# Patient Record
Sex: MALE | Race: BLACK OR AFRICAN AMERICAN | NOT HISPANIC OR LATINO | URBAN - METROPOLITAN AREA
[De-identification: names, ages, dates, MRNs, and addresses within clinical notes are randomized per-mention and may not be internally consistent; named-entity substitution may affect disease eponyms.]

---

## 2020-09-11 ENCOUNTER — EMERGENCY (EMERGENCY)
Facility: HOSPITAL | Age: 30
LOS: 1 days | Discharge: HOME | End: 2020-09-11
Attending: EMERGENCY MEDICINE | Admitting: EMERGENCY MEDICINE
Payer: SELF-PAY

## 2020-09-11 VITALS
RESPIRATION RATE: 16 BRPM | TEMPERATURE: 98 F | WEIGHT: 160.06 LBS | HEART RATE: 74 BPM | OXYGEN SATURATION: 98 % | SYSTOLIC BLOOD PRESSURE: 127 MMHG | DIASTOLIC BLOOD PRESSURE: 51 MMHG

## 2020-09-11 VITALS
RESPIRATION RATE: 17 BRPM | DIASTOLIC BLOOD PRESSURE: 65 MMHG | TEMPERATURE: 98 F | SYSTOLIC BLOOD PRESSURE: 125 MMHG | OXYGEN SATURATION: 99 % | HEART RATE: 70 BPM

## 2020-09-11 PROBLEM — Z00.00 ENCOUNTER FOR PREVENTIVE HEALTH EXAMINATION: Status: ACTIVE | Noted: 2020-09-11

## 2020-09-11 PROCEDURE — 96372 THER/PROPH/DIAG INJ SC/IM: CPT

## 2020-09-11 PROCEDURE — 99053 MED SERV 10PM-8AM 24 HR FAC: CPT

## 2020-09-11 PROCEDURE — 99284 EMERGENCY DEPT VISIT MOD MDM: CPT

## 2020-09-11 PROCEDURE — 99283 EMERGENCY DEPT VISIT LOW MDM: CPT | Mod: 25

## 2020-09-11 RX ORDER — DIAZEPAM 5 MG
5 TABLET ORAL ONCE
Refills: 0 | Status: DISCONTINUED | OUTPATIENT
Start: 2020-09-11 | End: 2020-09-11

## 2020-09-11 RX ORDER — KETOROLAC TROMETHAMINE 30 MG/ML
30 SYRINGE (ML) INJECTION ONCE
Refills: 0 | Status: DISCONTINUED | OUTPATIENT
Start: 2020-09-11 | End: 2020-09-11

## 2020-09-11 RX ADMIN — Medication 30 MILLIGRAM(S): at 01:20

## 2020-09-11 RX ADMIN — Medication 30 MILLIGRAM(S): at 01:35

## 2020-09-11 RX ADMIN — Medication 5 MILLIGRAM(S): at 01:21

## 2020-09-11 NOTE — ED ADULT TRIAGE NOTE - CHIEF COMPLAINT QUOTE
lower back pain/ lt flank area for 2 days noted after lifting weight at the gym. Pt denies urinary problem, no fever.

## 2020-09-11 NOTE — ED PROVIDER NOTE - PHYSICAL EXAMINATION
CONSTITUTIONAL: Well-appearing; well-nourished; in no apparent distress.   HEAD: Normocephalic; atraumatic.   EYES:  conjunctiva and sclera clear  ENT: normal nose; no rhinorrhea;  NECK: Supple; full ROM  RESPIRATORY: Breathing easily; no resp difficulty  BACK: no spinal tenderness. +spasm felt to L lumbar paraspinal muscles, not tender to palpation. No CVA tenderness or flank hematoma.   EXT: No cyanosis or edema;  SKIN: Normal for age and race; warm; dry; good turgor; no apparent lesions or rash.   NEURO: A & O x 3; face symmetric; grossly unremarkable. Motor/sensory intact in all extremities, ambulatory with steady gait.  PSYCHOLOGICAL: The patient’s mood and manner are appropriate.

## 2020-09-11 NOTE — ED PROVIDER NOTE - PATIENT PORTAL LINK FT
You can access the FollowMyHealth Patient Portal offered by Health system by registering at the following website: [email or web address]. By joining [employer]’s FollowMyHealth portal, you will also be able to view your health information using other applications (apps) compatible with our system.

## 2020-09-11 NOTE — ED PROVIDER NOTE - NSFOLLOWUPCLINICS_GEN_ALL_ED_FT
[facility] - Podiatry Clinic  Podiatry  [address] [place] [address]  Phone: [phone]  Fax:   Follow Up Time:

## 2020-09-11 NOTE — ED PROVIDER NOTE - OBJECTIVE STATEMENT
30M here w/ 2 days of L lower back pain that started while he was working out, he bent over to  a 30lb weight. reports pain immediately after lifting, that has not improved. pt reports some difficulty ambulating, denies numbness/ tingling. pt reports that this has happened in the past and he has taken muscle relaxers for it, has methocarbamol at home but was not sure what kind of medication it was, so did not take it, came in instead for eval. endorses poor posture when bending over, not squatting to  heavy weights. Does not stretch or do any mobility/flexibility exercises despite using heavy weights. denies cp, sob, n ,v ,d, fevers, cough, weakness, HA, neck tenderness, gu symptoms. Similar to prior episode.   · Presenting Symptoms: BACK PAIN, STIFFNESS  · Negative Findings: no anorexia, no bladder dysfunction, no bowel dysfunction, no constipation, no fatigue, no motor function loss, no neck tenderness, no numbness, no tingling  · Location: back   · Laterality: left   · Area: lower   · Radiation: no radiation   · Timing: sudden onset  · Duration: day(s)  2   · Context: lifting  · Incident Location: gym  · Aggravated Factors: walking  · Relieving Factors: none

## 2020-09-11 NOTE — ED ADULT NURSE NOTE - CHPI ED NUR SYMPTOMS NEG
no bladder dysfunction/no bowel dysfunction/no constipation/no tingling/no anorexia/no motor function loss/no neck tenderness/no numbness/no fatigue

## 2020-09-11 NOTE — ED PROVIDER NOTE - NSFOLLOWUPINSTRUCTIONS_ED_ALL_ED_FT
Lumbosacral Strain    Lumbosacral strain is an injury that causes pain in the lower back (lumbosacral spine). This injury usually happens from overstretching the muscles or ligaments along your spine. Ligaments are cord-like tissues that connect bones to other bones. A strain can affect one or more muscles or ligaments.  What are the causes?  This condition may be caused by:  A hard, direct hit to the back.Overstretching the lower back muscles. This may result from:  A fall.Lifting something heavy.Repetitive movements such as bending or crouching.What increases the risk?  The following factors may make you more likely to develop this condition:  Participating in sports or activities that involve:  A sudden twist of the back.Pushing or pulling motions.Being overweight or obese.Having poor strength and flexibility, especially tight hamstrings or weak muscles in the back or abdomen.Having too much of a curve in the lower back.Having a pelvis that is tilted forward.What are the signs or symptoms?  The main symptom of this condition is pain in the lower back, at the site of the strain. Pain may also be felt down one or both legs.  How is this diagnosed?  This condition is diagnosed based on your symptoms, your medical history, and a physical exam. During the physical exam, your health care provider may push on certain areas of your back to find the source of your pain.  You may be asked to bend forward, backward, and side to side to check your pain and range of motion. You may also have imaging tests, such as X-rays and an MRI.  How is this treated?  This condition may be treated by:  Applying heat and cold on the affected area.Taking medicines to help relieve pain and relax your muscles.Taking NSAIDs, such as ibuprofen, to help reduce swelling and discomfort.Doing stretching and strengthening exercises for your lower back.Symptoms usually improve within several weeks of treatment. However, recovery time varies. When your symptoms improve, gradually return to your normal routine as soon as possible to reduce pain, avoid stiffness, and keep muscle strength.  Follow these instructions at home:  Medicines     Take over-the-counter and prescription medicines only as told by your health care provider.Ask your health care provider if the medicine prescribed to you:  Requires you to avoid driving or using heavy machinery.Can cause constipation. You may need to take these actions to prevent or treat constipation:  Drink enough fluid to keep your urine pale yellow.Take over-the-counter or prescription medicines.Eat foods that are high in fiber, such as beans, whole grains, and fresh fruits and vegetables.Limit foods that are high in fat and processed sugars, such as fried or sweet foods.Managing pain, stiffness, and swelling         If directed, put ice on the injured area. To do this:  Put ice in a plastic bag.Place a towel between your skin and the bag.Leave the ice on for 20 minutes, 2–3 times a day.If directed, apply heat on the affected area as often as told by your health care provider. Use the heat source that your health care provider recommends, such as a moist heat pack or a heating pad.  Place a towel between your skin and the heat source.Leave the heat on for 20–30 minutes.Remove the heat if your skin turns bright red. This is especially important if you are unable to feel pain, heat, or cold. You may have a greater risk of getting burned.Activity     Rest as told by your health care provider.Do not stay in bed. Staying in bed for more than 1–2 days can delay your recovery.Return to your normal activities as told by your health care provider. Ask your health care provider what activities are safe for you.Avoid activities that take a lot of energy for as long as told by your health care provider.Do exercises as told by your health care provider. This includes stretching and strengthening exercises.General instructions     Sit up and stand up straight. Avoid leaning forward when you sit, or hunching over when you stand.Do not use any products that contain nicotine or tobacco, such as cigarettes, e-cigarettes, and chewing tobacco. If you need help quitting, ask your health care provider.Keep all follow-up visits as told by your health care provider. This is important.How is this prevented?     Use correct form when playing sports and lifting heavy objects.Use good posture when sitting and standing.Maintain a healthy weight.Sleep on a mattress with medium firmness to support your back.Do at least 150 minutes of moderate-intensity exercise each week, such as brisk walking or water aerobics. Try a form of exercise that takes stress off your back, such as swimming or stationary cycling.Maintain physical fitness, including:  Strength.Flexibility.Contact a health care provider if:  Your back pain does not improve after several weeks of treatment.Your symptoms get worse.Get help right away if:  Your back pain is severe.You cannot stand or walk.You have difficulty controlling when you urinate or when you have a bowel movement.You feel nauseous or you vomit.Your feet or legs get very cold, turn pale, or look blue.You have numbness, tingling, weakness, or problems using your arms or legs.You develop any of the following:  Shortness of breath.Dizziness.Pain in your legs.Weakness in your buttocks or legs.Summary  Lumbosacral strain is an injury that causes pain in the lower back (lumbosacral spine).This injury usually happens from overstretching the muscles or ligaments along your spine.This condition may be caused by a direct hit to the lower back or by overstretching the lower back muscles.Symptoms usually improve within several weeks of treatment.

## 2020-09-11 NOTE — ED ADULT NURSE NOTE - OBJECTIVE STATEMENT
pt a&ox4 resting comfortably in lounge chair. pt reports that 2 days ago while he was working out, he bent over to  a 30lb weight. reports pain immediately after lifting, that has not improved. pt reports some difficulty ambulating, denies numbness/ tingling. pt reports that this has happened in the past and he has taken muscle relaxers for it. denies cp, sob, n ,v ,d, fevers, cough, weakness, HA, neck tenderness, gu symptoms.

## 2020-09-11 NOTE — ED PROVIDER NOTE - CLINICAL SUMMARY MEDICAL DECISION MAKING FREE TEXT BOX
here w/ likely MSK strain. discussed proper posture, strengthening/stretching associated muscles. plan for IM toradol and muscle relaxant, pt already w/ tylenol and methocarbamol at home, to continue with this. No concern at this time for cord compression or cauda equina. DC home in NAD with strict return precautions given.    Also asking for # for podiatry clinic as having some issues when running and developing callouses, no emergent complaints today. clinic # given

## 2021-10-23 ENCOUNTER — EMERGENCY (EMERGENCY)
Facility: HOSPITAL | Age: 31
LOS: 1 days | Discharge: HOME | End: 2021-10-23
Admitting: EMERGENCY MEDICINE
Payer: COMMERCIAL

## 2021-10-23 VITALS
DIASTOLIC BLOOD PRESSURE: 68 MMHG | SYSTOLIC BLOOD PRESSURE: 109 MMHG | HEIGHT: 71 IN | HEART RATE: 60 BPM | WEIGHT: 169.98 LBS | TEMPERATURE: 98 F | RESPIRATION RATE: 17 BRPM | OXYGEN SATURATION: 98 %
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
## 2021-10-23 DIAGNOSIS — M54.50 LOW BACK PAIN, UNSPECIFIED: ICD-10-CM
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
## 2021-10-23 PROCEDURE — 96372 THER/PROPH/DIAG INJ SC/IM: CPT

## 2021-10-23 PROCEDURE — 99284 EMERGENCY DEPT VISIT MOD MDM: CPT

## 2021-10-23 PROCEDURE — 99283 EMERGENCY DEPT VISIT LOW MDM: CPT | Mod: 25

## 2021-10-23 RX ORDER — IBUPROFEN 200 MG
1 TABLET ORAL
Qty: 15 | Refills: 0
Start: 2021-10-23 | End: 2021-10-27

## 2021-10-23 RX ORDER — DIAZEPAM 5 MG
5 TABLET ORAL ONCE
Refills: 0 | Status: DISCONTINUED | OUTPATIENT
Start: 2021-10-23 | End: 2021-10-23

## 2021-10-23 RX ORDER — KETOROLAC TROMETHAMINE 30 MG/ML
15 SYRINGE (ML) INJECTION ONCE
Refills: 0 | Status: DISCONTINUED | OUTPATIENT
Start: 2021-10-23 | End: 2021-10-23

## 2021-10-23 RX ORDER — LIDOCAINE 4 G/100G
1 CREAM TOPICAL
Qty: 10 | Refills: 0
Start: 2021-10-23 | End: 2021-10-27

## 2021-10-23 RX ORDER — CYCLOBENZAPRINE HYDROCHLORIDE 10 MG/1
1 TABLET, FILM COATED ORAL
Qty: 6 | Refills: 0
Start: 2021-10-23 | End: 2021-10-25

## 2021-10-23 RX ORDER — LIDOCAINE 4 G/100G
1 CREAM TOPICAL ONCE
Refills: 0 | Status: COMPLETED | OUTPATIENT
Start: 2021-10-23 | End: 2021-10-23

## 2021-10-23 RX ADMIN — Medication 15 MILLIGRAM(S): at 18:27

## 2021-10-23 RX ADMIN — Medication 5 MILLIGRAM(S): at 18:27

## 2021-10-23 RX ADMIN — LIDOCAINE 1 PATCH: 4 CREAM TOPICAL at 18:27

## 2021-10-23 NOTE — ED ADULT TRIAGE NOTE - CHIEF COMPLAINT QUOTE
pt c/o lower back pain after doing squats at gym 6 days ago. pain radiates to bilateral legs. no numbness/tingling or loss of bowel/bladder fx

## 2021-10-23 NOTE — ED PROVIDER NOTE - PRINCIPAL DIAGNOSIS
I was awakened by a very sharp pain to my left leg.  I have never had a pain like it before.
Back pain

## 2021-10-23 NOTE — ED PROVIDER NOTE - OBJECTIVE STATEMENT
30 y/o M with no reported PMHx presents to the ED c/o 6 days of lower back pain after doing squats at the gym. Pt states he was lifting weights when he felt a pull in his lower back, and states he's had lower back pain, worse w/ movement and position change, since the incident. Has not taken anything at home for the pain. Denies radiation of pain, numbness or weakness of b/l lower extremities, saddle anesthesia, bowel/bladder incontinence, or h/o prior back injuries or surgeries.

## 2021-10-23 NOTE — ED ADULT NURSE NOTE - OBJECTIVE STATEMENT
31y male presents to ED c/o lower back pain. Pt states he was at gym at Monday, doing squats and felt pain in lower back. Pain has not gone away so pt presents to ED. Ambulatory. RUCKER. No signs of trauma/deformity. Denies numbness/tingling/PMH. A&Ox4.

## 2021-10-23 NOTE — ED PROVIDER NOTE - NS ED ROS FT
Constitutional: No fever. No chills.  Eyes: No redness. No discharge. No vision change.   ENT: No sore throat. No ear pain.  Cardiovascular: No chest pain. No leg swelling.  Respiratory: No cough. No shortness of breath.  GI: No abdominal pain. No vomiting. No diarrhea.   MSK: + lower back pain  No joint pain.    Skin: No rash. No abrasions.   Neuro: No numbness. No weakness.   Psych: No known mental health issues.

## 2021-10-23 NOTE — ED PROVIDER NOTE - CLINICAL SUMMARY MEDICAL DECISION MAKING FREE TEXT BOX
Pt hemodynamically stable. No midline spinal tenderness, red flag back pain sx, or focal neurologic deficits. Ambulating without difficulty. Pain improved after toradol 15mg IM, valium 5mg po, lidocaine patch. Discussed conservative management of back pain. Rx given for ibuprofen, flexeril and lidocaine patches. Return precautions given.

## 2021-10-23 NOTE — ED PROVIDER NOTE - NSFOLLOWUPINSTRUCTIONS_ED_ALL_ED_FT
Take ibuprofen 600mg up to three times daily for pain and inflammation.  Take one tab of flexeril 10mg twice daily for muscle spasm.  Apply lidocaine patch twice daily to affected area for additional relief. You can use heating pad or heat packs on top of the patch.     These medications were sent to your pharmacy but lidocaine patches and ibuprofen are also available over the counter.    Rest for several days and perform gentle stretching exercises.     If your symptoms fail to improve, please follow up with your primary care doctor.    Return to the Emergency Department if you develop fever>100.4F, worsening back pain, numbness or weakness in legs, loss of control of bowels or bladder, numbness around rectum or any other concerns.

## 2021-10-23 NOTE — ED PROVIDER NOTE - CHPI ED SYMPTOMS NEG
no weakness, no saddle anesthesia, no h/o prior back injuries/surgeries/no bladder dysfunction/no bowel dysfunction/no numbness

## 2021-10-23 NOTE — ED PROVIDER NOTE - PATIENT PORTAL LINK FT
You can access the FollowMyHealth Patient Portal offered by [facility] by registering at the following website: [email or web address]. By joining [facility]’s FollowMyHealth portal, you will also be able to view your health information using other applications (apps) compatible with our system.

## 2021-10-23 NOTE — ED PROVIDER NOTE - PHYSICAL EXAMINATION
VITAL SIGNS: I have reviewed nursing notes and confirm.  CONSTITUTIONAL: Well-developed; well-nourished; in no acute distress.   SKIN:  warm and dry, no acute rash.   HEAD:  normocephalic, atraumatic.  EYES: PERRL, EOM intact; conjunctiva and sclera clear.  ENT: No nasal discharge; airway clear.   NECK: Supple; non tender.  CARD: S1, S2 normal; no murmurs, gallops, or rubs. Regular rate and rhythm.   RESP:  Clear to auscultation b/l, no wheezes, rales or rhonchi.  ABD: Normal bowel sounds; soft; non-distended; non-tender; no guarding/ rebound.  EXT: no midline spinal ttp, mild ttp of the L lumbar paraspinal muscles, 5/5 strength w/ hip, knee, and ankle flexion and extension, sensation equal and intact, ambulating w/o difficulty.  Normal ROM. No clubbing, cyanosis or edema. 2+ pulses to b/l ue/le.  NEURO: Alert, oriented, grossly unremarkable  PSYCH: Cooperative, mood and affect appropriate. VITAL SIGNS: I have reviewed nursing notes and confirm.  CONSTITUTIONAL: Well-developed; well-nourished; in no acute distress.   SKIN:  warm and dry, no acute rash.   HEAD:  normocephalic, atraumatic.  EYES: PERRL, EOM intact; conjunctiva and sclera clear.  ENT: No nasal discharge; airway clear.   NECK: Supple; non tender.  CARD: S1, S2 normal; no murmurs, gallops, or rubs. Regular rate and rhythm.   RESP:  Clear to auscultation b/l, no wheezes, rales or rhonchi.  ABD: Normal bowel sounds; soft; non-distended; non-tender; no guarding/ rebound.  EXT: no midline spinal ttp, mild ttp of the L lumbar paraspinal muscles, 5/5 strength w/ hip, knee, and ankle flexion and extension, sensation equal and intact, ambulating w/o difficulty.  NEURO: Alert, oriented, grossly unremarkable  PSYCH: Cooperative, mood and affect appropriate.

## 2021-10-24 PROBLEM — Z78.9 OTHER SPECIFIED HEALTH STATUS: Chronic | Status: ACTIVE | Noted: 2020-09-11

## 2021-10-30 ENCOUNTER — EMERGENCY (EMERGENCY)
Facility: HOSPITAL | Age: 31
LOS: 1 days | Discharge: HOME | End: 2021-10-30
Admitting: EMERGENCY MEDICINE
Payer: COMMERCIAL

## 2021-10-30 VITALS
OXYGEN SATURATION: 97 % | HEIGHT: 71 IN | RESPIRATION RATE: 16 BRPM | TEMPERATURE: 98 F | HEART RATE: 65 BPM | WEIGHT: 169.98 LBS | DIASTOLIC BLOOD PRESSURE: 71 MMHG | SYSTOLIC BLOOD PRESSURE: 111 MMHG

## 2021-10-30 VITALS
TEMPERATURE: 98 F | OXYGEN SATURATION: 98 % | SYSTOLIC BLOOD PRESSURE: 114 MMHG | RESPIRATION RATE: 16 BRPM | HEART RATE: 71 BPM | DIASTOLIC BLOOD PRESSURE: 69 MMHG
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
## 2021-10-30 DIAGNOSIS — M54.50 LOW BACK PAIN, UNSPECIFIED: ICD-10-CM
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
## 2021-10-30 PROCEDURE — 99283 EMERGENCY DEPT VISIT LOW MDM: CPT | Mod: 25

## 2021-10-30 PROCEDURE — 96372 THER/PROPH/DIAG INJ SC/IM: CPT | Mod: XU

## 2021-10-30 PROCEDURE — 99284 EMERGENCY DEPT VISIT MOD MDM: CPT

## 2021-10-30 RX ORDER — KETOROLAC TROMETHAMINE 30 MG/ML
15 SYRINGE (ML) INJECTION ONCE
Refills: 0 | Status: DISCONTINUED | OUTPATIENT
Start: 2021-10-30 | End: 2021-10-30

## 2021-10-30 RX ORDER — METHOCARBAMOL 500 MG/1
750 TABLET, FILM COATED ORAL ONCE
Refills: 0 | Status: COMPLETED | OUTPATIENT
Start: 2021-10-30 | End: 2021-10-30

## 2021-10-30 RX ORDER — TRAMADOL HYDROCHLORIDE 50 MG/1
50 TABLET ORAL ONCE
Refills: 0 | Status: DISCONTINUED | OUTPATIENT
Start: 2021-10-30 | End: 2021-10-30

## 2021-10-30 RX ORDER — METHOCARBAMOL 500 MG/1
2 TABLET, FILM COATED ORAL
Qty: 42 | Refills: 0
Start: 2021-10-30 | End: 2021-11-05

## 2021-10-30 RX ORDER — TRAMADOL HYDROCHLORIDE 50 MG/1
1 TABLET ORAL
Qty: 14 | Refills: 0
Start: 2021-10-30 | End: 2021-11-05

## 2021-10-30 RX ADMIN — METHOCARBAMOL 750 MILLIGRAM(S): 500 TABLET, FILM COATED ORAL at 02:46

## 2021-10-30 RX ADMIN — Medication 15 MILLIGRAM(S): at 02:46

## 2021-10-30 RX ADMIN — TRAMADOL HYDROCHLORIDE 50 MILLIGRAM(S): 50 TABLET ORAL at 02:46

## 2021-10-30 NOTE — ED PROVIDER NOTE - OBJECTIVE STATEMENT
32 y/o M  presents to the ED c/o 2 weeks of lower back pain after doing squats at the gym. Pt states he was lifting weights when he felt a pull in his lower back, Pt states pain is worse w/ movement and position change, since the incident.  he was taken medications as recommended in the ER , but pain still persist. Denies radiation of pain, numbness or weakness of b/l lower extremities, saddle anesthesia, bowel/bladder incontinence, or h/o prior back injuries or surgeries.

## 2021-10-30 NOTE — ED PROVIDER NOTE - PATIENT PORTAL LINK FT
You can access the FollowMyHealth Patient Portal offered by [facility] by registering at the following website: [email or web address]. By joining [employer]’s FollowMyHealth portal, you will also be able to view your health information using other applications (apps) compatible with our system.

## 2021-10-30 NOTE — ED ADULT NURSE NOTE - HOW OFTEN DO YOU HAVE A DRINK CONTAINING ALCOHOL?
Patient expressing desire to die and stating \"just let me die\"  Patient stated he has a heavy drinking problem and has been hospitalized within the last year on a psych unit     SAD score 6  Dr. Antoinette Oshea notified   Suicide precautions initiated, sitter assigned to sit at bedside Monthly or less

## 2021-10-30 NOTE — ED PROVIDER NOTE - NSFOLLOWUPINSTRUCTIONS_ED_ALL_ED_FT
LOW BACK STRAIN - Discharge Care           Low Back Strain    WHAT YOU NEED TO KNOW:    Low back strain is an injury to your lower back muscles or tendons. Tendons are strong tissues that connect muscles to bones. The lower back supports most of your body weight and helps you move, twist, and bend.    DISCHARGE INSTRUCTIONS:    Seek care immediately if:   •You hear or feel a pop in your lower back.      •You have increased swelling or pain in your lower back.      •You have trouble moving your legs.      •Your legs are numb.      Contact your healthcare provider if:   •You have a fever.      •Your pain does not go away, even after treatment.      •You have questions or concerns about your condition or care.      Medicines: The following medicines may be ordered by your healthcare provider:   •Acetaminophen decreases pain and fever. It is available without a doctor's order. Ask how much to take and how often to take it. Follow directions. Acetaminophen can cause liver damage if not taken correctly.      •NSAIDs, such as ibuprofen, help decrease swelling, pain, and fever. This medicine is available with or without a doctor's order. NSAIDs can cause stomach bleeding or kidney problems in certain people. If you take blood thinner medicine, always ask your healthcare provider if NSAIDs are safe for you. Always read the medicine label and follow directions.      •Muscle relaxers help decrease pain and muscle spasms.      •Prescription pain medicine may be given. Ask how to take this medicine safely.      •Take your medicine as directed. Contact your healthcare provider if you think your medicine is not helping or if you have side effects. Tell him or her if you are allergic to any medicine. Keep a list of the medicines, vitamins, and herbs you take. Include the amounts, and when and why you take them. Bring the list or the pill bottles to follow-up visits. Carry your medicine list with you in case of an emergency.      Self-care:   •Rest as directed. You may need to rest in bed for a period of time after your injury. Do not lift heavy objects.       •Apply ice on your back for 15 to 20 minutes every hour or as directed. Use an ice pack, or put crushed ice in a plastic bag. Cover it with a towel. Ice helps prevent tissue damage and decreases swelling and pain.      •Apply heat on your lower back for 20 to 30 minutes every 2 hours for as many days as directed. Heat helps decrease pain and muscle spasms.      •Slowly start to increase your activity as the pain decreases, or as directed.      Prevent another low back strain:   •Use correct body movements. ?Bend at the hips and knees when you  objects. Do not bend from the waist. Use your leg muscles as you lift the load. Do not use your back. Keep the object close to your chest as you lift it. Try not to twist or lift anything above your waist.      ?Change your position often when you stand for long periods of time. Rest one foot on a small box or footrest, and then switch to the other foot often.      ?Try not to sit for long periods of time. When you do, sit in a straight-backed chair with your feet flat on the floor.      ?Never reach, pull, or push while you are sitting.      •Warm up before you exercise. Do exercises that strengthen your back muscles. Ask your healthcare provider about the best exercise plan for you.      •Maintain a healthy weight. Ask your healthcare provider how much you should weigh. Ask him to help you create a weight loss plan if you are overweight.       Follow up with your doctor as directed: Write down your questions so you remember to ask them during your visits.

## 2021-10-30 NOTE — ED PROVIDER NOTE - MUSCULOSKELETAL, MLM
Spine appears normal, range of motion is not limited,  diffuse tenderness to paraspinal region, no localized vertebral point of tenderness

## 2021-10-30 NOTE — ED PROVIDER NOTE - CLINICAL SUMMARY MEDICAL DECISION MAKING FREE TEXT BOX
32 yo male generally healthy in the Er with lower back pain x 2 weeks. Spasm like, worse with movement/positional changes. No abdominal; pain, no focal neuro deficits, no dysuria, hematuria.  will try muscle relaxants and NSAIds for pain, lidocaine patch and tramadol. will re-evaluate.

## 2023-10-02 NOTE — ED PROVIDER NOTE - NS ED MD EM SELECTION
We are committed to providing you with the best care possible. In order to help us achieve these goals please remember to bring all medications, herbal products, and over the counter supplements with you to each visit. If your provider has ordered testing for you, please be sure to follow up with our office if you have not received results within 7 days after the testing took place. *If you receive a survey after visiting one of our offices, please take time to share your experience concerning your physician office visit. These surveys are confidential and no health information about you is shared. We are eager to improve for you and we are counting on your feedback to help make that happen. Wt Readings from Last 3 Encounters:   10/02/23 143 lb (64.9 kg)   09/12/23 143 lb (64.9 kg)   08/28/23 144 lb 6.4 oz (65.5 kg)                 Learning About Vision Tests  What are vision tests? The four most common vision tests are visual acuity tests, refraction, visual field tests, and color vision tests. Visual acuity (sharpness) tests  These tests are used: To see if you need glasses or contact lenses. To monitor an eye problem. To check an eye injury. Visual acuity tests are done as part of routine exams. You may also have this test when you get your 's license or apply for some types of jobs. Visual field tests  These tests are used: To check for vision loss in any area of your range of vision. To screen for certain eye diseases. To look for nerve damage after a stroke, head injury, or other problem that could reduce blood flow to the brain. Refraction and color tests  A refraction test is done to find the right prescription for glasses and contact lenses. A color vision test is done to check for color blindness.   Color vision is often tested as part of a routine exam. You may also have this test when you apply for a job where recognizing different colors is important, such as truck
[id number] Detailed